# Patient Record
Sex: FEMALE | Race: WHITE | NOT HISPANIC OR LATINO | Employment: UNEMPLOYED | URBAN - METROPOLITAN AREA
[De-identification: names, ages, dates, MRNs, and addresses within clinical notes are randomized per-mention and may not be internally consistent; named-entity substitution may affect disease eponyms.]

---

## 2024-01-03 ENCOUNTER — OFFICE VISIT (OUTPATIENT)
Dept: OBSTETRICS AND GYNECOLOGY | Facility: CLINIC | Age: 39
End: 2024-01-03
Payer: MEDICAID

## 2024-01-03 VITALS
HEART RATE: 86 BPM | BODY MASS INDEX: 26.11 KG/M2 | HEIGHT: 60 IN | SYSTOLIC BLOOD PRESSURE: 110 MMHG | DIASTOLIC BLOOD PRESSURE: 74 MMHG | WEIGHT: 133 LBS

## 2024-01-03 DIAGNOSIS — N93.9 ABNORMAL UTERINE BLEEDING: Primary | ICD-10-CM

## 2024-01-03 PROCEDURE — 1160F RVW MEDS BY RX/DR IN RCRD: CPT | Performed by: OBSTETRICS & GYNECOLOGY

## 2024-01-03 PROCEDURE — 1159F MED LIST DOCD IN RCRD: CPT | Performed by: OBSTETRICS & GYNECOLOGY

## 2024-01-03 PROCEDURE — 99203 OFFICE O/P NEW LOW 30 MIN: CPT | Performed by: OBSTETRICS & GYNECOLOGY

## 2024-01-03 RX ORDER — SODIUM CHLORIDE 0.9 % (FLUSH) 0.9 %
10 SYRINGE (ML) INJECTION AS NEEDED
OUTPATIENT
Start: 2024-01-03

## 2024-01-03 RX ORDER — SODIUM CHLORIDE 9 MG/ML
40 INJECTION, SOLUTION INTRAVENOUS AS NEEDED
OUTPATIENT
Start: 2024-01-03

## 2024-01-03 RX ORDER — SODIUM CHLORIDE, SODIUM LACTATE, POTASSIUM CHLORIDE, CALCIUM CHLORIDE 600; 310; 30; 20 MG/100ML; MG/100ML; MG/100ML; MG/100ML
125 INJECTION, SOLUTION INTRAVENOUS CONTINUOUS
OUTPATIENT
Start: 2024-01-03

## 2024-01-03 RX ORDER — SODIUM CHLORIDE 0.9 % (FLUSH) 0.9 %
3 SYRINGE (ML) INJECTION EVERY 12 HOURS SCHEDULED
OUTPATIENT
Start: 2024-01-03

## 2024-01-03 RX ORDER — ONDANSETRON 2 MG/ML
4 INJECTION INTRAMUSCULAR; INTRAVENOUS EVERY 6 HOURS PRN
OUTPATIENT
Start: 2024-01-03

## 2024-01-03 NOTE — PROGRESS NOTES
"GYN Visit    CC: Abnormal menses    HPI:   38 y.o. who presents for evaluation of abnormal menses. Menses is monthly. Very heavy and painful for the first 2 days. Then tapers off after 4-5 days. Really cannot function for those first 2 days. This has been this way for years. Has tried OCPs in the past with no improvement. Has used the birth control patch with no improvement. NSAIDs don't help.  The patient is interested in proceeding with surgery.    History: PMHx, Meds, Allergies, PSHx, Social Hx, and POBHx all reviewed and updated.    /74   Pulse 86   Ht 152.4 cm (60\")   Wt 60.3 kg (133 lb)   LMP 2023 (Exact Date)   BMI 25.97 kg/m²     Physical Exam  Vitals and nursing note reviewed. Exam conducted with a chaperone present.   Constitutional:       General: She is not in acute distress.     Appearance: Normal appearance. She is not ill-appearing.   Abdominal:      General: Abdomen is flat. There is no distension.      Palpations: Abdomen is soft. There is no mass.      Tenderness: There is no abdominal tenderness. There is no guarding or rebound.      Hernia: No hernia is present.   Genitourinary:     General: Normal vulva.      Exam position: Lithotomy position.      Labia:         Right: No rash, tenderness, lesion or injury.         Left: No rash, tenderness, lesion or injury.       Vagina: No signs of injury. No vaginal discharge, erythema, tenderness, bleeding or prolapsed vaginal walls.      Cervix: No cervical motion tenderness, friability, lesion, erythema or cervical bleeding.      Uterus: Not deviated, not enlarged, not fixed and not tender.       Adnexa:         Right: No mass or tenderness.          Left: No mass or tenderness.     Musculoskeletal:         General: No swelling.      Right lower leg: No edema.      Left lower leg: No edema.   Skin:     General: Skin is warm and dry.      Findings: No rash.   Neurological:      Mental Status: She is alert and oriented to person, " place, and time.   Psychiatric:         Mood and Affect: Mood normal.         Behavior: Behavior normal.         Thought Content: Thought content normal.         Judgment: Judgment normal.     Pelvic ultrasound 11/21/2023 reviewed      ASSESSMENT AND PLAN:  Diagnoses and all orders for this visit:    1. Abnormal uterine bleeding (Primary)  Assessment & Plan:  Given the patient's significantly abnormal menses and failure medical therapy in the past she is interested in proceeding with surgical therapy.  Given her failure past medical therapy I think is reasonable consider surgery.  We did discuss the option of continued medical therapy or even considering something like an IUD.  The patient declines.  We discussed surgical options including D&C alone, endometrial ablation and hysterectomy.  The risk, benefits, alternatives of each were discussed.  The patient is most interested in proceeding with endometrial ablation.  The risks, benefits, and alternatives to the procedure have been reviewed the patient.  We have discussed that the goal of endometrial ablation is to resume a normal menses and the amenorrhea is not guaranteed.  We discussed that amenorrhea only occurs in 40 to 50% of all patients who undergo endometrial ablation.  The risks included, but not limited to, infection, bleeding, hemorrhage, blood transfusion. Injury to nearby structures including: Bowel, bladder, pelvic blood vessels and nerves, ureters, and other nearby structures.  We have discussed the risk of delayed thermal injury to these nearby structures.  We have discussed the risk of continued abnormal bleeding and the possible need for further surgery, including hysterectomy.  We have discussed that pregnancy is contraindicated once an ablation has occurred and that the patient should never seek out pregnancy after endometrial ablation.  We discussed the risks of anesthesia.  The risks of postoperative complications, such as: Venous  thromboembolism, myocardial infarction, stroke, and death.  The patient expressed her extending of the risks on the fracture, and alternatives to the procedure, and wishes to proceed.  The risks, benefits, and alternatives to the procedure have been reviewed the patient.  We have discussed that the goal of endometrial ablation is to resume a normal menses and the amenorrhea is not guaranteed.  We discussed that amenorrhea only occurs in 40 to 50% of all patients who undergo endometrial ablation.  The risks included, but not limited to, infection, bleeding, hemorrhage, blood transfusion. Injury to nearby structures including: Bowel, bladder, pelvic blood vessels and nerves, ureters, and other nearby structures.  We have discussed the risk of delayed thermal injury to these nearby structures.  We have discussed the risk of continued abnormal bleeding and the possible need for further surgery, including hysterectomy.  We have discussed that pregnancy is contraindicated once an ablation has occurred and that the patient should never seek out pregnancy after endometrial ablation.  We discussed the risks of anesthesia.  The risks of postoperative complications, such as: Venous thromboembolism, myocardial infarction, stroke, and death.  The patient expressed her extending of the risks on the fracture, and alternatives to the procedure, and wishes to proceed.      Orders:  -     CBC (No Diff)  -     TSH  -     Case Request; Standing  -     sodium chloride 0.9 % flush 3 mL  -     sodium chloride 0.9 % flush 10 mL  -     sodium chloride 0.9 % infusion 40 mL  -     lactated ringers infusion  -     ondansetron (ZOFRAN) injection 4 mg  -     ceFAZolin (ANCEF) 2,000 mg in sodium chloride 0.9 % 100 mL IVPB  -     Case Request    Other orders  -     Follow Anesthesia Guidelines / Protocol; Future  -     Follow Anesthesia Guidelines / Protocol; Standing  -     Verify / Perform Chlorhexidine Skin Prep; Standing  -     Verify /  Perform Chlorhexidine Skin Prep if Indicated (If Not Already Completed); Standing  -     Obtain Informed Consent; Future  -     Provide NPO Instructions to Patient; Future  -     Chlorhexidine Skin Prep; Future  -     Notify Physician - Standard; Standing  -     Type & Screen; Standing  -     Insert Peripheral IV; Standing  -     Saline Lock & Maintain IV Access; Standing  -     CBC & Differential; Standing  -     hCG, Quantitative, Pregnancy; Standing        Counseling: TRACK MENSES, RTO if <q21 days (frequent) or >q3mo (infrequent IF not on hormonal BC), >7d long, heavy, or painful.    All BIRTH CONTROL options R/B/A/SE/E of each reviewed in detail.  MERRILL risk w hormonal BIRTH CONTROL reviewed (estrogen containing only), S/Sx to watch for discussed and questions answered.  Newer studies indicate possible increased breast cancer reviewed (both estrogen and progestin only).    Follow Up:  Return in about 2 weeks (around 1/17/2024) for EMB.      Zay Messer MD  01/03/2024

## 2024-01-04 NOTE — ASSESSMENT & PLAN NOTE
Given the patient's significantly abnormal menses and failure medical therapy in the past she is interested in proceeding with surgical therapy.  Given her failure past medical therapy I think is reasonable consider surgery.  We did discuss the option of continued medical therapy or even considering something like an IUD.  The patient declines.  We discussed surgical options including D&C alone, endometrial ablation and hysterectomy.  The risk, benefits, alternatives of each were discussed.  The patient is most interested in proceeding with endometrial ablation.  The risks, benefits, and alternatives to the procedure have been reviewed the patient.  We have discussed that the goal of endometrial ablation is to resume a normal menses and the amenorrhea is not guaranteed.  We discussed that amenorrhea only occurs in 40 to 50% of all patients who undergo endometrial ablation.  The risks included, but not limited to, infection, bleeding, hemorrhage, blood transfusion. Injury to nearby structures including: Bowel, bladder, pelvic blood vessels and nerves, ureters, and other nearby structures.  We have discussed the risk of delayed thermal injury to these nearby structures.  We have discussed the risk of continued abnormal bleeding and the possible need for further surgery, including hysterectomy.  We have discussed that pregnancy is contraindicated once an ablation has occurred and that the patient should never seek out pregnancy after endometrial ablation.  We discussed the risks of anesthesia.  The risks of postoperative complications, such as: Venous thromboembolism, myocardial infarction, stroke, and death.  The patient expressed her extending of the risks on the fracture, and alternatives to the procedure, and wishes to proceed.  The risks, benefits, and alternatives to the procedure have been reviewed the patient.  We have discussed that the goal of endometrial ablation is to resume a normal menses and the  amenorrhea is not guaranteed.  We discussed that amenorrhea only occurs in 40 to 50% of all patients who undergo endometrial ablation.  The risks included, but not limited to, infection, bleeding, hemorrhage, blood transfusion. Injury to nearby structures including: Bowel, bladder, pelvic blood vessels and nerves, ureters, and other nearby structures.  We have discussed the risk of delayed thermal injury to these nearby structures.  We have discussed the risk of continued abnormal bleeding and the possible need for further surgery, including hysterectomy.  We have discussed that pregnancy is contraindicated once an ablation has occurred and that the patient should never seek out pregnancy after endometrial ablation.  We discussed the risks of anesthesia.  The risks of postoperative complications, such as: Venous thromboembolism, myocardial infarction, stroke, and death.  The patient expressed her extending of the risks on the fracture, and alternatives to the procedure, and wishes to proceed.

## 2024-01-18 ENCOUNTER — TELEPHONE (OUTPATIENT)
Dept: OBSTETRICS AND GYNECOLOGY | Facility: CLINIC | Age: 39
End: 2024-01-18

## 2024-02-06 ENCOUNTER — PROCEDURE VISIT (OUTPATIENT)
Dept: OBSTETRICS AND GYNECOLOGY | Facility: CLINIC | Age: 39
End: 2024-02-06
Payer: MEDICAID

## 2024-02-06 VITALS
SYSTOLIC BLOOD PRESSURE: 106 MMHG | DIASTOLIC BLOOD PRESSURE: 79 MMHG | WEIGHT: 134 LBS | BODY MASS INDEX: 26.31 KG/M2 | HEIGHT: 60 IN

## 2024-02-06 DIAGNOSIS — N93.9 ABNORMAL UTERINE BLEEDING: Primary | ICD-10-CM

## 2024-02-06 DIAGNOSIS — Z01.812 PRE-PROCEDURAL LABORATORY EXAMINATION: ICD-10-CM

## 2024-02-06 LAB
B-HCG UR QL: NEGATIVE
DEPRECATED RDW RBC AUTO: 41.8 FL (ref 37–54)
ERYTHROCYTE [DISTWIDTH] IN BLOOD BY AUTOMATED COUNT: 12 % (ref 12.3–15.4)
EXPIRATION DATE: NORMAL
HCT VFR BLD AUTO: 43.1 % (ref 34–46.6)
HGB BLD-MCNC: 14.4 G/DL (ref 12–15.9)
INTERNAL NEGATIVE CONTROL: NORMAL
INTERNAL POSITIVE CONTROL: NORMAL
Lab: NORMAL
MCH RBC QN AUTO: 31.6 PG (ref 26.6–33)
MCHC RBC AUTO-ENTMCNC: 33.4 G/DL (ref 31.5–35.7)
MCV RBC AUTO: 94.5 FL (ref 79–97)
PLATELET # BLD AUTO: 266 10*3/MM3 (ref 140–450)
PMV BLD AUTO: 11.1 FL (ref 6–12)
RBC # BLD AUTO: 4.56 10*6/MM3 (ref 3.77–5.28)
T4 FREE SERPL-MCNC: 1.26 NG/DL (ref 0.93–1.7)
TSH SERPL DL<=0.05 MIU/L-ACNC: 0.83 UIU/ML (ref 0.27–4.2)
WBC NRBC COR # BLD AUTO: 10.05 10*3/MM3 (ref 3.4–10.8)

## 2024-02-06 PROCEDURE — 88305 TISSUE EXAM BY PATHOLOGIST: CPT | Performed by: OBSTETRICS & GYNECOLOGY

## 2024-02-06 PROCEDURE — 84443 ASSAY THYROID STIM HORMONE: CPT | Performed by: OBSTETRICS & GYNECOLOGY

## 2024-02-06 PROCEDURE — 85027 COMPLETE CBC AUTOMATED: CPT | Performed by: OBSTETRICS & GYNECOLOGY

## 2024-02-06 PROCEDURE — 58100 BIOPSY OF UTERUS LINING: CPT | Performed by: OBSTETRICS & GYNECOLOGY

## 2024-02-06 PROCEDURE — 81025 URINE PREGNANCY TEST: CPT | Performed by: OBSTETRICS & GYNECOLOGY

## 2024-02-06 PROCEDURE — 84439 ASSAY OF FREE THYROXINE: CPT | Performed by: OBSTETRICS & GYNECOLOGY

## 2024-02-06 NOTE — PROGRESS NOTES
Endometrial Biopsy    Karey Hammond is a 38 y.o. female,   , whose last menstrual period was Patient's last menstrual period was 2023..  The patient has a history of abnormal uterine bleeding and presents for an endometrial biopsy.  Patient denies vaginal bleeding. .  After the indications, risks, benefits, and alternatives to performing and endometrial biopsy were explained to the patient, and she desires to proceed.  A urine pregnancy test was negative.     PROCEDURE:  The patient was placed on the table in the supine lithotomy position.  She was draped in the appropriate manner.  A speculum was placed in the vagina.  The cervix was visualized and prepped with Betadine. A tenaculum was placed. A small plastic 5 mm Pipelle syringe curette was inserted into the cervical canal.  The uterus was sounded to 8 cms.  A vigorous four quadrant biopsy was performed, removing a moderate amount of tissue.  This tissue was placed in Formalin and sent to pathology.  The patient tolerated the procedure well and reported Moderate cramping.  She had Mild cramping at the time of discharge.  Physical Exam    Procedures    Review of Systems      Plan:  Orders Placed This Encounter   Procedures    CBC (No Diff)     Order Specific Question:   Release to patient     Answer:   Routine Release [[id number]]    TSH     Order Specific Question:   Release to patient     Answer:   Routine Release [[id number]]    T4, free     Order Specific Question:   Release to patient     Answer:   Routine Release [[id number]]    POC Pregnancy, Urine     Order Specific Question:   Release to patient     Answer:   Routine Release [[id number]]       Problem List Items Addressed This Visit       Abnormal uterine bleeding - Primary    Relevant Orders    Tissue Pathology Exam    CBC (No Diff)    TSH    T4, free     Other Visit Diagnoses       Pre-procedural laboratory examination        Relevant Orders    POC Pregnancy, Urine (Completed)                 Instructions  Call the office in 5 business days for biopsy results.  Patient instructed to call the office if develops a fever of 100.4 or greater, vaginal bleeding heavier than a period, foul vaginal discharge or pain.      Zay Messer MD

## 2024-02-06 NOTE — PATIENT INSTRUCTIONS
Venipuncture Blood Specimen Collection  Venipuncture performed in left arm by Mary Ann Grier with good hemostasis. Patient tolerated the procedure well without complications.   02/06/24   Mary Ann Grier

## 2024-02-08 LAB
CYTO UR: NORMAL
LAB AP CASE REPORT: NORMAL
LAB AP CLINICAL INFORMATION: NORMAL
PATH REPORT.FINAL DX SPEC: NORMAL
PATH REPORT.GROSS SPEC: NORMAL

## 2024-03-13 PROCEDURE — S0260 H&P FOR SURGERY: HCPCS | Performed by: OBSTETRICS & GYNECOLOGY

## 2024-03-13 NOTE — H&P
[facility]   HISTORY AND PHYSICAL    Patient Name: Karey Hammond  : 1985  MRN: [id number]  Primary Care Physician:  Leonor Figueroa APRN  Date of admission: 3/14/2024    Subjective   Subjective     Chief Complaint: Here for surgery    HPI:  Karey Hammond is a 38 y.o. female who presents for surgical management of abnormal uterine bleeding.  The patient has very heavy menstrual flow lasting for 7 days.  She has tried numerous medical therapies including birth control pills in the past with no improvement.  She also has significant pelvic cramping and pain the first 2 days of her menstrual cycle.  She wishes to proceed with surgical therapy at this time.  After reviewing all of her surgical options the patient has settled on proceeding with endometrial ablation    Review of Systems   All systems were reviewed and negative except for: Heavy menses, painful menses    Personal History     Past Medical History:   Diagnosis Date    Abnormal uterine bleeding (AUB)     Anxiety        Past Surgical History:   Procedure Laterality Date    DILATATION AND CURETTAGE      TUBAL ABDOMINAL LIGATION         Family History: family history includes Breast cancer in her maternal great-grandmother. Otherwise pertinent FHx was reviewed and not pertinent to current issue.    Social History:  reports that she has been smoking cigarettes. She does not have any smokeless tobacco history on file. She reports current alcohol use. She reports current drug use. Drug: Marijuana.    Home Medications:         Allergies:  No Known Allergies    Objective   Objective     Vitals:   Temp:  [97.5 °F (36.4 °C)] 97.5 °F (36.4 °C)  Heart Rate:  [86] 86  Resp:  [18] 18  BP: (116)/(71) 116/71  Physical Exam    Constitutional: Awake, alert   Eyes: PERRLA, sclerae anicteric, no conjunctival injection   HENT: NCAT, mucous membranes moist   Neck: Supple, no thyromegaly, no lymphadenopathy, trachea midline   Respiratory: Clear to auscultation  bilaterally, nonlabored respirations    Cardiovascular: RRR, no murmurs, rubs, or gallops, palpable pedal pulses bilaterally   Gastrointestinal: Positive bowel sounds, soft, nontender, nondistended              Genitourinary: Normal external genitalia, vagina, and normal-appearing cervix.  The uterus is approximately 9 to 10 cm in size mobile and nontender.  There are no palpable uterine or adnexal masses.   Musculoskeletal: No bilateral ankle edema, no clubbing or cyanosis to extremities   Psychiatric: Appropriate affect, cooperative   Neurologic: Oriented x 3, strength symmetric in all extremities, speech clear   Skin: No rashes     Result Review    Result Review:  I have personally reviewed the results from the time of this admission to 3/14/2024 10:54 EDT and agree with these findings:  [x]  Laboratory  []  Microbiology  [x]  Radiology  []  EKG/Telemetry   []  Cardiology/Vascular   [x]  Pathology  [x]  Old records  []  Other:      Assessment & Plan   Assessment / Plan   Active Hospital Problems:  Active Hospital Problems    Diagnosis     **Abnormal uterine bleeding      Plan:   The patient has significant abnormal uterine bleeding resulting in impaired quality of life, and refractory to medical therapies.  She wishes to proceed with surgical therapy with an endometrial ablation to treat her symptoms.  She also has significant dysmenorrhea.  We have discussed that endometrial ablation does not specifically treat painful menstrual cycles, unless that pain is related to the amount of flow that the patient is having.  The patient expresses her understanding and desires to proceed with endometrial ablation.  She has declined hysterectomy.  The risks, benefits, and alternatives to the procedure have been reviewed the patient.  We have discussed that the goal of endometrial ablation is to resume a normal menses and the amenorrhea is not guaranteed.  We discussed that amenorrhea only occurs in 40 to 50% of all patients  who undergo endometrial ablation.  The risks included, but not limited to, infection, bleeding, hemorrhage, blood transfusion. Injury to nearby structures including: Bowel, bladder, pelvic blood vessels and nerves, ureters, and other nearby structures.  We have discussed the risk of delayed thermal injury to these nearby structures.  We have discussed the risk of continued abnormal bleeding and the possible need for further surgery, including hysterectomy.  We have discussed that pregnancy is contraindicated once an ablation has occurred and that the patient should never seek out pregnancy after endometrial ablation.  We discussed the risks of anesthesia.  The risks of postoperative complications, such as: Venous thromboembolism, myocardial infarction, stroke, and death.  The patient expressed her extending of the risks on the fracture, and alternatives to the procedure, and wishes to proceed.        Electronically signed by Zay Messer MD, 03/13/24, 10:18 AM EDT.

## 2024-03-13 NOTE — PRE-PROCEDURE INSTRUCTIONS
PATIENT INSTRUCTED TO BE:    - NOTHING TO EAT AFTER MIDNIGHT OR CHEW, EXCEPT CAN HAVE CLEAR LIQUIDS 2 HOURS PRIOR TO SURGERY ARRIVAL TIME     - TO HOLD ALL VITAMINS, SUPPLEMENTS, NSAIDS FOR ONE WEEK PRIOR TO THEIR SURGICAL PROCEDURE    - DO NOT TAKE _---------------_ 7 DAYS PRIOR TO PROCEDURE PER ANESTHESIA RECOMMENDATIONS/INSTRUCTIONS     - INSTRUCTED PT TO USE SURGICAL SOAP 1 TIME THE NIGHT PRIOR TO SURGERY OR THE AM OF SURGERY.   USE SOAP FROM NECK TO TOES AVOID THEIR FACE, HAIR, AND PRIVATE PARTS. INSTRUCTED NO LOTIONS, JEWELRY, PIERCINGS, OR DEODORANT DAY OF SURGERY    - IF DIABETIC, CHECK BLOOD GLUCOSE IF LESS THAN 70 OR HAVING SYMPTOMS CALL THE PREOP AREA FOR INSTRUCTIONS ON AM OF SURGERY ([phone] )    -INSTRUCTED TO TAKE THE FOLLOWING MEDICATIONS THE DAY OF SURGERY WITH SIPS OF WATER:             NONE         - DO NOT BRING ANY MEDICATIONS WITH YOU TO THE HOSPITAL THE DAY OF SURGERY, EXCEPT IF USE INHALERS. BRING INHALERS DAY OF SURGERY       - BRING CPAP OR BIPAP TO THE HOSPITAL ONLY IF ARE SPENDING THE NIGHT    - DO NOT SMOKE OR VAPE 24 HOURS PRIOR TO PROCEDURE PER ANESTHESIA REQUEST     -MAKE SURE YOU HAVE A RIDE HOME OR SOMEONE TO STAY WITH YOU THE DAY OF THE PROCEDURE AFTER YOU GO HOME    - FOLLOW ANY OTHER INSTRUCTIONS GIVEN TO YOU BY YOUR SURGEON'S OFFICE.     - PREADMISSION TESTING NURSE CORWIN JONES RN AT  [phone] IF HAVE ANY QUESTIONS     PATIENT PROVIDED THE NUMBER FOR PREOP SURGICAL DEPT IF HAD QUESTIONS AFTER HOURS PRIOR TO SURGERY ([phone] )  INFORMED PT IF NO ANSWER, LEAVE A MESSAGE AND SOMEONE WILL RETURN THEIR CALL       PATIENT VERBALIZED UNDERSTANDING

## 2024-03-14 ENCOUNTER — ANESTHESIA EVENT (OUTPATIENT)
Dept: PERIOP | Facility: HOSPITAL | Age: 39
End: 2024-03-14
Payer: MEDICAID

## 2024-03-14 ENCOUNTER — ANESTHESIA (OUTPATIENT)
Dept: PERIOP | Facility: HOSPITAL | Age: 39
End: 2024-03-14
Payer: MEDICAID

## 2024-03-14 ENCOUNTER — HOSPITAL ENCOUNTER (OUTPATIENT)
Facility: HOSPITAL | Age: 39
Setting detail: HOSPITAL OUTPATIENT SURGERY
Discharge: HOME | End: 2024-03-14
Attending: OBSTETRICS & GYNECOLOGY | Admitting: OBSTETRICS & GYNECOLOGY
Payer: MEDICAID

## 2024-03-14 VITALS
WEIGHT: 133.16 LBS | BODY MASS INDEX: 25.14 KG/M2 | DIASTOLIC BLOOD PRESSURE: 60 MMHG | TEMPERATURE: 97.8 F | HEIGHT: 61 IN | HEART RATE: 54 BPM | OXYGEN SATURATION: 100 % | SYSTOLIC BLOOD PRESSURE: 122 MMHG | RESPIRATION RATE: 20 BRPM

## 2024-03-14 DIAGNOSIS — Z98.890 S/P ENDOMETRIAL ABLATION: Primary | ICD-10-CM

## 2024-03-14 DIAGNOSIS — N93.9 ABNORMAL UTERINE BLEEDING: ICD-10-CM

## 2024-03-14 LAB
ABO GROUP BLD: NORMAL
ABO GROUP BLD: NORMAL
BASOPHILS # BLD AUTO: 0.04 10*3/MM3 (ref 0–0.2)
BASOPHILS NFR BLD AUTO: 0.5 % (ref 0–1.5)
BLD GP AB SCN SERPL QL: NEGATIVE
DEPRECATED RDW RBC AUTO: 45.9 FL (ref 37–54)
EOSINOPHIL # BLD AUTO: 0.1 10*3/MM3 (ref 0–0.4)
EOSINOPHIL NFR BLD AUTO: 1.1 % (ref 0.3–6.2)
ERYTHROCYTE [DISTWIDTH] IN BLOOD BY AUTOMATED COUNT: 12.9 % (ref 12.3–15.4)
HCG INTACT+B SERPL-ACNC: <0.5 MIU/ML
HCT VFR BLD AUTO: 42.2 % (ref 34–46.6)
HGB BLD-MCNC: 13.5 G/DL (ref 12–15.9)
IMM GRANULOCYTES # BLD AUTO: 0.03 10*3/MM3 (ref 0–0.05)
IMM GRANULOCYTES NFR BLD AUTO: 0.3 % (ref 0–0.5)
LYMPHOCYTES # BLD AUTO: 2.4 10*3/MM3 (ref 0.7–3.1)
LYMPHOCYTES NFR BLD AUTO: 27.2 % (ref 19.6–45.3)
MCH RBC QN AUTO: 30.5 PG (ref 26.6–33)
MCHC RBC AUTO-ENTMCNC: 32 G/DL (ref 31.5–35.7)
MCV RBC AUTO: 95.5 FL (ref 79–97)
MONOCYTES # BLD AUTO: 0.54 10*3/MM3 (ref 0.1–0.9)
MONOCYTES NFR BLD AUTO: 6.1 % (ref 5–12)
NEUTROPHILS NFR BLD AUTO: 5.71 10*3/MM3 (ref 1.7–7)
NEUTROPHILS NFR BLD AUTO: 64.8 % (ref 42.7–76)
NRBC BLD AUTO-RTO: 0 /100 WBC (ref 0–0.2)
PLATELET # BLD AUTO: 285 10*3/MM3 (ref 140–450)
PMV BLD AUTO: 10.1 FL (ref 6–12)
RBC # BLD AUTO: 4.42 10*6/MM3 (ref 3.77–5.28)
RH BLD: NEGATIVE
RH BLD: NEGATIVE
T&S EXPIRATION DATE: NORMAL
WBC NRBC COR # BLD AUTO: 8.82 10*3/MM3 (ref 3.4–10.8)

## 2024-03-14 PROCEDURE — 84702 CHORIONIC GONADOTROPIN TEST: CPT | Performed by: OBSTETRICS & GYNECOLOGY

## 2024-03-14 PROCEDURE — 86850 RBC ANTIBODY SCREEN: CPT | Performed by: OBSTETRICS & GYNECOLOGY

## 2024-03-14 PROCEDURE — 58563 HYSTEROSCOPY ABLATION: CPT | Performed by: OBSTETRICS & GYNECOLOGY

## 2024-03-14 PROCEDURE — 85025 COMPLETE CBC W/AUTO DIFF WBC: CPT | Performed by: OBSTETRICS & GYNECOLOGY

## 2024-03-14 PROCEDURE — 88305 TISSUE EXAM BY PATHOLOGIST: CPT | Performed by: OBSTETRICS & GYNECOLOGY

## 2024-03-14 PROCEDURE — 25010000002 HYDROMORPHONE 1 MG/ML SOLUTION: Performed by: NURSE ANESTHETIST, CERTIFIED REGISTERED

## 2024-03-14 PROCEDURE — 0 CEFAZOLIN SODIUM 2 G RECONSTITUTED SOLUTION: Performed by: OBSTETRICS & GYNECOLOGY

## 2024-03-14 PROCEDURE — 25010000002 BUPIVACAINE (PF) 0.5 % SOLUTION: Performed by: OBSTETRICS & GYNECOLOGY

## 2024-03-14 PROCEDURE — 25010000002 ONDANSETRON PER 1 MG: Performed by: NURSE ANESTHETIST, CERTIFIED REGISTERED

## 2024-03-14 PROCEDURE — 86900 BLOOD TYPING SEROLOGIC ABO: CPT | Performed by: OBSTETRICS & GYNECOLOGY

## 2024-03-14 PROCEDURE — 25810000003 LACTATED RINGERS PER 1000 ML: Performed by: ANESTHESIOLOGY

## 2024-03-14 PROCEDURE — 86900 BLOOD TYPING SEROLOGIC ABO: CPT

## 2024-03-14 PROCEDURE — 86901 BLOOD TYPING SEROLOGIC RH(D): CPT

## 2024-03-14 PROCEDURE — 25010000002 MIDAZOLAM PER 1MG: Performed by: ANESTHESIOLOGY

## 2024-03-14 PROCEDURE — 86901 BLOOD TYPING SEROLOGIC RH(D): CPT | Performed by: OBSTETRICS & GYNECOLOGY

## 2024-03-14 PROCEDURE — 25010000002 PROPOFOL 10 MG/ML EMULSION

## 2024-03-14 RX ORDER — IBUPROFEN 600 MG/1
600 TABLET ORAL EVERY 6 HOURS PRN
Qty: 60 TABLET | Refills: 1 | Status: SHIPPED | OUTPATIENT
Start: 2024-03-14

## 2024-03-14 RX ORDER — PHENYLEPHRINE HCL IN 0.9% NACL 1 MG/10 ML
SYRINGE (ML) INTRAVENOUS AS NEEDED
Status: DISCONTINUED | OUTPATIENT
Start: 2024-03-14 | End: 2024-03-14 | Stop reason: SURG

## 2024-03-14 RX ORDER — ONDANSETRON 2 MG/ML
4 INJECTION INTRAMUSCULAR; INTRAVENOUS EVERY 6 HOURS PRN
Status: DISCONTINUED | OUTPATIENT
Start: 2024-03-14 | End: 2024-03-14 | Stop reason: HOSPADM

## 2024-03-14 RX ORDER — TRAMADOL HYDROCHLORIDE 50 MG/1
50 TABLET ORAL EVERY 6 HOURS PRN
Qty: 12 TABLET | Refills: 0 | Status: SHIPPED | OUTPATIENT
Start: 2024-03-14

## 2024-03-14 RX ORDER — MIDAZOLAM HYDROCHLORIDE 2 MG/2ML
2 INJECTION, SOLUTION INTRAMUSCULAR; INTRAVENOUS ONCE
Status: COMPLETED | OUTPATIENT
Start: 2024-03-14 | End: 2024-03-14

## 2024-03-14 RX ORDER — SODIUM CHLORIDE 9 MG/ML
40 INJECTION, SOLUTION INTRAVENOUS AS NEEDED
Status: DISCONTINUED | OUTPATIENT
Start: 2024-03-14 | End: 2024-03-14 | Stop reason: HOSPADM

## 2024-03-14 RX ORDER — OXYCODONE HYDROCHLORIDE 5 MG/1
5 TABLET ORAL
Status: COMPLETED | OUTPATIENT
Start: 2024-03-14 | End: 2024-03-14

## 2024-03-14 RX ORDER — MAGNESIUM HYDROXIDE 1200 MG/15ML
LIQUID ORAL AS NEEDED
Status: DISCONTINUED | OUTPATIENT
Start: 2024-03-14 | End: 2024-03-14 | Stop reason: HOSPADM

## 2024-03-14 RX ORDER — SODIUM CHLORIDE 0.9 % (FLUSH) 0.9 %
3 SYRINGE (ML) INJECTION EVERY 12 HOURS SCHEDULED
Status: DISCONTINUED | OUTPATIENT
Start: 2024-03-14 | End: 2024-03-14 | Stop reason: HOSPADM

## 2024-03-14 RX ORDER — ONDANSETRON 2 MG/ML
4 INJECTION INTRAMUSCULAR; INTRAVENOUS ONCE AS NEEDED
Status: DISCONTINUED | OUTPATIENT
Start: 2024-03-14 | End: 2024-03-14 | Stop reason: HOSPADM

## 2024-03-14 RX ORDER — DEXMEDETOMIDINE HYDROCHLORIDE 100 UG/ML
INJECTION, SOLUTION INTRAVENOUS AS NEEDED
Status: DISCONTINUED | OUTPATIENT
Start: 2024-03-14 | End: 2024-03-14 | Stop reason: SURG

## 2024-03-14 RX ORDER — SODIUM CHLORIDE, SODIUM LACTATE, POTASSIUM CHLORIDE, CALCIUM CHLORIDE 600; 310; 30; 20 MG/100ML; MG/100ML; MG/100ML; MG/100ML
9 INJECTION, SOLUTION INTRAVENOUS CONTINUOUS PRN
Status: DISCONTINUED | OUTPATIENT
Start: 2024-03-14 | End: 2024-03-14 | Stop reason: HOSPADM

## 2024-03-14 RX ORDER — PROMETHAZINE HYDROCHLORIDE 25 MG/1
25 SUPPOSITORY RECTAL ONCE AS NEEDED
Status: DISCONTINUED | OUTPATIENT
Start: 2024-03-14 | End: 2024-03-14 | Stop reason: HOSPADM

## 2024-03-14 RX ORDER — MEPERIDINE HYDROCHLORIDE 25 MG/ML
12.5 INJECTION INTRAMUSCULAR; INTRAVENOUS; SUBCUTANEOUS
Status: DISCONTINUED | OUTPATIENT
Start: 2024-03-14 | End: 2024-03-14 | Stop reason: HOSPADM

## 2024-03-14 RX ORDER — SODIUM CHLORIDE 0.9 % (FLUSH) 0.9 %
10 SYRINGE (ML) INJECTION AS NEEDED
Status: DISCONTINUED | OUTPATIENT
Start: 2024-03-14 | End: 2024-03-14 | Stop reason: HOSPADM

## 2024-03-14 RX ORDER — PROPOFOL 10 MG/ML
VIAL (ML) INTRAVENOUS AS NEEDED
Status: DISCONTINUED | OUTPATIENT
Start: 2024-03-14 | End: 2024-03-14 | Stop reason: SURG

## 2024-03-14 RX ORDER — BUPIVACAINE HCL/0.9 % NACL/PF 0.1 %
2000 PLASTIC BAG, INJECTION (ML) EPIDURAL ONCE
Status: COMPLETED | OUTPATIENT
Start: 2024-03-14 | End: 2024-03-14

## 2024-03-14 RX ORDER — IBUPROFEN 600 MG/1
600 TABLET ORAL EVERY 6 HOURS PRN
Status: DISCONTINUED | OUTPATIENT
Start: 2024-03-14 | End: 2024-03-14 | Stop reason: HOSPADM

## 2024-03-14 RX ORDER — SCOLOPAMINE TRANSDERMAL SYSTEM 1 MG/1
1 PATCH, EXTENDED RELEASE TRANSDERMAL ONCE
Status: DISCONTINUED | OUTPATIENT
Start: 2024-03-14 | End: 2024-03-14 | Stop reason: HOSPADM

## 2024-03-14 RX ORDER — PROMETHAZINE HYDROCHLORIDE 12.5 MG/1
25 TABLET ORAL ONCE AS NEEDED
Status: DISCONTINUED | OUTPATIENT
Start: 2024-03-14 | End: 2024-03-14 | Stop reason: HOSPADM

## 2024-03-14 RX ORDER — ACETAMINOPHEN 500 MG
1000 TABLET ORAL ONCE
Status: COMPLETED | OUTPATIENT
Start: 2024-03-14 | End: 2024-03-14

## 2024-03-14 RX ORDER — SODIUM CHLORIDE, SODIUM LACTATE, POTASSIUM CHLORIDE, CALCIUM CHLORIDE 600; 310; 30; 20 MG/100ML; MG/100ML; MG/100ML; MG/100ML
125 INJECTION, SOLUTION INTRAVENOUS CONTINUOUS
Status: DISCONTINUED | OUTPATIENT
Start: 2024-03-14 | End: 2024-03-14 | Stop reason: HOSPADM

## 2024-03-14 RX ORDER — BUPIVACAINE HYDROCHLORIDE 5 MG/ML
INJECTION, SOLUTION EPIDURAL; INTRACAUDAL AS NEEDED
Status: DISCONTINUED | OUTPATIENT
Start: 2024-03-14 | End: 2024-03-14 | Stop reason: HOSPADM

## 2024-03-14 RX ADMIN — HYDROMORPHONE HYDROCHLORIDE 0.25 MG: 1 INJECTION, SOLUTION INTRAMUSCULAR; INTRAVENOUS; SUBCUTANEOUS at 12:53

## 2024-03-14 RX ADMIN — OXYCODONE 5 MG: 5 TABLET ORAL at 12:48

## 2024-03-14 RX ADMIN — OXYCODONE 5 MG: 5 TABLET ORAL at 13:27

## 2024-03-14 RX ADMIN — SODIUM CHLORIDE, POTASSIUM CHLORIDE, SODIUM LACTATE AND CALCIUM CHLORIDE 9 ML/HR: 600; 310; 30; 20 INJECTION, SOLUTION INTRAVENOUS at 09:06

## 2024-03-14 RX ADMIN — PROPOFOL 200 MCG/KG/MIN: 10 INJECTION, EMULSION INTRAVENOUS at 11:54

## 2024-03-14 RX ADMIN — MIDAZOLAM HYDROCHLORIDE 2 MG: 1 INJECTION, SOLUTION INTRAMUSCULAR; INTRAVENOUS at 11:40

## 2024-03-14 RX ADMIN — SCOPALAMINE 1 PATCH: 1 PATCH, EXTENDED RELEASE TRANSDERMAL at 09:13

## 2024-03-14 RX ADMIN — ACETAMINOPHEN 1000 MG: 500 TABLET ORAL at 09:12

## 2024-03-14 RX ADMIN — DEXMEDETOMIDINE 20 MCG: 100 INJECTION, SOLUTION INTRAVENOUS at 12:02

## 2024-03-14 RX ADMIN — ONDANSETRON 4 MG: 2 INJECTION INTRAMUSCULAR; INTRAVENOUS at 14:03

## 2024-03-14 RX ADMIN — Medication 2000 MG: at 12:00

## 2024-03-14 RX ADMIN — Medication 200 MCG: at 11:55

## 2024-03-14 RX ADMIN — PROPOFOL 50 MG: 10 INJECTION, EMULSION INTRAVENOUS at 11:54

## 2024-03-14 NOTE — OP NOTE
DILATATION AND CURETTAGE HYSTEROSCOPY NOVASURE ENDOMETRIAL ABLATION  Procedure Report    Patient Name:  Karey Hammond  YOB: 1985    Date of Surgery:  3/14/2024    Pre-op Diagnosis:   Abnormal uterine bleeding [N93.9]       Post-Op Diagnosis Codes:     * Abnormal uterine bleeding [N93.9]    Procedure(s):  DILATATION AND CURETTAGE HYSTEROSCOPY ENDOMETRIAL ABLATION    Staff:  Surgeon(s):  Zay Messer MD    Anesthesia: Monitored Anesthesia Care    Estimated Blood Loss:  20 ML      Specimen:          Specimens       ID Source Type Tests Collected By Collected At Frozen?    A Endometrial Curettings Tissue TISSUE PATHOLOGY EXAM   Zay Messer MD 3/14/24 1211 No                Findings: Normal external genitalia, vagina, and normal-appearing cervix.  The uterine cavity was normal without evidence of submucous myoma, congenital anomaly, or endometrial polyp.  Post ablation hysteroscopy revealed a good global ablation without unintended injury such as uterine perforation.    Complications: None    Description of Procedure: After reviewing the informed consent, including the risks, benefits and alternatives to the procedure, the patient expressed her understanding and wished to proceed.  She was taken to the operating room with an I.V. in place and running.  She was placed on the operating table in the dorsal supine position. She was given a MAC anesthetic.  She was re-positioned with her arms out to the side, and her legs in Yellofin stirrups.  We took care in positioning both the arms and the legs, padding any potential pressure points.  The patient was prepped and draped in the usual sterile fashion.  An in and out catheterization was performed to drain the patient's bladder.  A surgical time-out was performed.  I inserted a Gee retractor into the posterior vagina, and a Vita retractor into the anterior vagina. I grasped the cervix with the single tooth tenaculum.  I carried out my  paracervical block with 10 ml of 0.5% Marcaine with Epinephrine. I introduced a 5 mm 30 degree diagnostic hysteroscope, with normal saline as my distension media, through the external cervical os and into the uterine cavity without difficulty.      I surveyed the uterine cavity.  The uterine cavity appeared normal, without evidence of endometrial polyp, submucous myoma, or congenital anomaly.  The hysteroscope was removed.  The uterus was sounded to 8.5 cm.  Using a Hegar dilator, the cervix were estimated to be 4.5 cm.  The cavity length thus, was 4 cm.  The cervix was dilated to 6 mm.  I performed a systematic sharp curettage of the entire endometrium. This specimen was passed off as endometrial curettings.  I re-introduced the hysteroscope. There was a good curettage of the entire endometrium, and no evidence of any uterine injury. The hysteroscope was removed.  The NovaSure ablation device was called for.  The device array was deployed, and I had a uterine with greater than 2.5 cm.  The array was retracted back into the sleeve.  The NovaSure device was inserted through the external os, and gently to the fundus of the uterus, using a bow and arrow technique.  The array was then again deployed, and the device seated, per the 's recommendations.  The uterine width was 3.1 cm.  The power was determined by the device to be 68 Caceres.  The cervical collar was advanced against the cervix.  The cavity assessment was carried out, and passed.  The device was enabled, and the ablation initiated.  The ablation cycle carried out for 1 minute 27 seconds.  At the end of the ablation cycle, the array was retracted back into the NovaSure device a sleeve, using the bow and arrow technique.  The hysteroscope was reintroduced, and the cavity surveyed.  There was a good global ablation, without unintended injury or uterine perforation.  The hysteroscope was removed.  I removed the tenaculum.  The tenaculum sites were  hemostatic. The other instruments were removed.  Normal saline was used as a uterine distention media, and there was no significant fluid deficit.    The patient was taken out of lithotomy position, awoken from her anesthesia and then taken to the recovery room in satisfactory condition.  All counts were correct x 2.  The patient received Kefzol as her preoperative antibiotics. The patient will be discharged home when she meets discharge criteria. She will follow-up with me in 2-3 weeks.  She was instructed to call the office for temperature than greater than 100 degrees Fahrenheit, shortness of breath or chest pain, excessive nausea or vomiting with inability to tolerate oral intake, pain that is worsening despite current pain medications, heavy vaginal bleeding, or other concerns.          Zay Messer MD     Date: 3/14/2024  Time: 12:39 EDT

## 2024-03-14 NOTE — DISCHARGE INSTRUCTIONS
DISCHARGE INSTRUCTIONS  GYNECOLOGICAL  PROCEDURES      For your surgery you had:  General anesthesia (you may have a sore throat for the first 24 hours)  IV sedation  Local anesthesia  Monitored anesthesia care  You received a medicated patch for nausea prevention today (behind your ear). It is recommended that you remove it 24-48 hours post-operatively. It must be removed within 72 hours.  You received an anesthesia medication today that can cause hormonal forms of birth control to be ineffective. You should use a different form of birth control (to prevent pregnancy) for 7 days.   You may experience dizziness, drowsiness, or lightheadedness for several hours following surgery.  Do not stay alone today or tonight.  Limit your activity for 24 hours.  Resume your diet slowly.  Follow any special dietary instructions you may have been given by your doctor.  You should not drive or operate machinery, drink alcohol, or sign legally binding documents for 24 hours or while you are taking pain medication.    NOTIFY YOUR DOCTOR IF YOU EXPERIENCE ANY OF THE FOLLOWING:  Temperature greater than 101 degrees Fahrenheit  Shaking Chills  Redness or excessive drainage from incision  Nausea, vomiting and/or pain that is not controlled by prescribed medications  Increase in bleeding or bleeding that is excessive  Unable to urinate in 6 hours after surgery  If unable to reach your doctor, please go to the closest Emergency Room [] Remove dressing:      [] Skin adhesive will flake off in 10-14 days.    [] Nothing in the vagina for 2 weeks to include intercourse, douches, or tampons.  [] You may resume intercourse and the use of tampons as your physician has instructed you.      Vaginal bleeding may be expected for several days with flow decreasing with time and never any heavier than a normal  period.  If you have an ablation, vaginal discharge is expected after bleeding stops.   If you have foul smelling discharge, notify your  physician.  Medications per physician instructions as indicated on After Visit Summary.    Last dose of pain medication was given at:    .    SPECIAL INSTRUCTIONS:   No driving while taking narcotic pain medications  No intercourse, tampons, douching, nothing in the vagina until follow-up  No tub baths for 2 weeks  Okay to shower    Call for temperature greater than 100 °F, shortness of breath or chest pain, heavy vaginal bleeding soaking a pad in less than 1 hour, redness swelling or drainage from the incisions, excessive nausea or vomiting, or pain that is worsening despite current pain medications

## 2024-03-14 NOTE — ANESTHESIA PREPROCEDURE EVALUATION
Anesthesia Evaluation     Patient summary reviewed and Nursing notes reviewed   no history of anesthetic complications:   NPO Solid Status: > 8 hours  NPO Liquid Status: > 2 hours           Airway   Mallampati: II  TM distance: >3 FB  Neck ROM: full  No difficulty expected  Dental    (+) upper dentures and poor dentition        Pulmonary - normal exam    breath sounds clear to auscultation  (+) a smoker Current,  Cardiovascular - negative cardio ROS and normal exam  Exercise tolerance: good (4-7 METS)    Rhythm: regular  Rate: normal        Neuro/Psych- negative ROS  GI/Hepatic/Renal/Endo - negative ROS     Musculoskeletal     Abdominal    Substance History      OB/GYN          Other        ROS/Med Hx Other: PAT Nursing Notes unavailable.               Anesthesia Plan    ASA 2     general     (Patient understands anesthesia not responsible for dental damage.)  intravenous induction     Anesthetic plan, risks, benefits, and alternatives have been provided, discussed and informed consent has been obtained with: patient and spouse/significant other.    Plan discussed with CRNA.    CODE STATUS:

## 2024-03-14 NOTE — ANESTHESIA POSTPROCEDURE EVALUATION
Patient: Karey Hammond    Procedure Summary       Date: 03/14/24 Room / Location: [facility] OR 05 / [facility] MAIN OR    Anesthesia Start: 1150 Anesthesia Stop: 1224    Procedure: DILATATION AND CURETTAGE HYSTEROSCOPY ENDOMETRIAL ABLATION (Vagina) Diagnosis:       Abnormal uterine bleeding      (Abnormal uterine bleeding [N93.9])    Surgeons: Zay Messer MD Provider: Geo Keane MD    Anesthesia Type: general ASA Status: 2            Anesthesia Type: general    Vitals  Vitals Value Taken Time   /69 03/14/24 1314   Temp 36.2 °C (97.1 °F) 03/14/24 1313   Pulse 63 03/14/24 1316   Resp 16 03/14/24 1313   SpO2 98 % 03/14/24 1316   Vitals shown include unfiled device data.        Post Anesthesia Care and Evaluation    Patient location during evaluation: bedside  Patient participation: complete - patient participated  Level of consciousness: awake  Pain management: adequate    Airway patency: patent  PONV Status: none  Cardiovascular status: acceptable  Respiratory status: acceptable  Hydration status: acceptable    Comments: An Anesthesiologist personally participated in the most demanding procedures (including induction and emergence if applicable) in the anesthesia plan, monitored the course of anesthesia administration at frequent intervals and remained physically present and available for immediate diagnosis and treatment of emergencies.

## 2024-04-02 ENCOUNTER — OFFICE VISIT (OUTPATIENT)
Dept: OBSTETRICS AND GYNECOLOGY | Facility: CLINIC | Age: 39
End: 2024-04-02
Payer: MEDICAID

## 2024-04-02 VITALS
BODY MASS INDEX: 24.92 KG/M2 | WEIGHT: 132 LBS | SYSTOLIC BLOOD PRESSURE: 108 MMHG | DIASTOLIC BLOOD PRESSURE: 77 MMHG | HEART RATE: 120 BPM | HEIGHT: 61 IN

## 2024-04-02 DIAGNOSIS — Z48.89 POSTOPERATIVE VISIT: Primary | ICD-10-CM

## 2024-04-02 DIAGNOSIS — Z98.890 S/P ENDOMETRIAL ABLATION: ICD-10-CM

## 2024-04-02 PROBLEM — N93.9 ABNORMAL UTERINE BLEEDING: Status: RESOLVED | Noted: 2024-01-03 | Resolved: 2024-04-02

## 2024-04-02 PROCEDURE — 1159F MED LIST DOCD IN RCRD: CPT | Performed by: OBSTETRICS & GYNECOLOGY

## 2024-04-02 PROCEDURE — 99024 POSTOP FOLLOW-UP VISIT: CPT | Performed by: OBSTETRICS & GYNECOLOGY

## 2024-04-02 PROCEDURE — 1160F RVW MEDS BY RX/DR IN RCRD: CPT | Performed by: OBSTETRICS & GYNECOLOGY

## 2024-04-02 NOTE — PROGRESS NOTES
"Post Operative Visit      CC: Post operative follow up    Subjective:   Pain:  No  Vaginal bleeding:  No  Vaginal discharge: Yes still having the thin watery discharge intermittently  Fever/chills:  No  Good appetite:  No  Normal bladder function:  Yes  Normal bowel function:  Yes  Hot flashes: N/A  No other problems or concerns    /77   Pulse 120   Ht 154.9 cm (61\")   Wt 59.9 kg (132 lb)   Breastfeeding No   BMI 24.94 kg/m²     Physical Exam  Vitals and nursing note reviewed.   Constitutional:       General: She is not in acute distress.     Appearance: Normal appearance. She is not ill-appearing.   Neurological:      Mental Status: She is alert and oriented to person, place, and time.   Psychiatric:         Mood and Affect: Mood normal.         Behavior: Behavior normal.         Thought Content: Thought content normal.         Judgment: Judgment normal.       Operative report, surgical findings and any pathology reviewed.    Assessment and Plan:  Diagnoses and all orders for this visit:    1. Postoperative visit (Primary)  Assessment & Plan:  Stable postoperative course  May resume all normal activities  Recommend OTC NSAIDs for any further postoperative pain      2. S/P endometrial ablation         Any available photos and/or pathology were reviewed.  All questions answered.   Ok to resume normal activities  Ok to resume intercourse  Return to school/work without limitations    Follow Up:  Return for Annual physical.    Zay Messer MD  04/02/2024    "

## 2024-04-02 NOTE — ASSESSMENT & PLAN NOTE
Stable postoperative course  May resume all normal activities  Recommend OTC NSAIDs for any further postoperative pain

## 2024-04-05 ENCOUNTER — TELEPHONE (OUTPATIENT)
Dept: OBSTETRICS AND GYNECOLOGY | Facility: CLINIC | Age: 39
End: 2024-04-05
Payer: MEDICAID

## 2024-04-05 NOTE — TELEPHONE ENCOUNTER
[facility] received a fax that requires your attention. The document has been indexed to the patient’s chart for your review.      Reason for sending: RECORDS REQUEST    Documents Description: CONSULTS NOTES REQUESTED    Name of Sender: MARIAELENA LUCERO     Date Indexed: 03/25/202    Notes (if needed):

## 2025-08-01 ENCOUNTER — OFFICE VISIT (OUTPATIENT)
Dept: OBSTETRICS AND GYNECOLOGY | Age: 40
End: 2025-08-01
Payer: MEDICAID

## 2025-08-01 VITALS
BODY MASS INDEX: 25.52 KG/M2 | HEIGHT: 60 IN | WEIGHT: 130 LBS | HEART RATE: 80 BPM | SYSTOLIC BLOOD PRESSURE: 103 MMHG | DIASTOLIC BLOOD PRESSURE: 70 MMHG

## 2025-08-01 DIAGNOSIS — N93.9 ABNORMAL UTERINE BLEEDING (AUB): ICD-10-CM

## 2025-08-01 DIAGNOSIS — Z01.419 WOMEN'S ANNUAL ROUTINE GYNECOLOGICAL EXAMINATION: Primary | ICD-10-CM

## 2025-08-01 DIAGNOSIS — Z98.890 S/P ENDOMETRIAL ABLATION: ICD-10-CM

## 2025-08-01 PROCEDURE — G0123 SCREEN CERV/VAG THIN LAYER: HCPCS | Performed by: OBSTETRICS & GYNECOLOGY

## 2025-08-06 ENCOUNTER — RESULTS FOLLOW-UP (OUTPATIENT)
Dept: OBSTETRICS AND GYNECOLOGY | Age: 40
End: 2025-08-06
Payer: MEDICAID

## 2025-08-06 DIAGNOSIS — A59.9 TRICHOMONAS VAGINALIS INFECTION: Primary | ICD-10-CM

## 2025-08-06 LAB
CONV .: NORMAL
CYTOLOGIST CVX/VAG CYTO: NORMAL
CYTOLOGY CVX/VAG DOC CYTO: NORMAL
CYTOLOGY CVX/VAG DOC THIN PREP: NORMAL
DX ICD CODE: NORMAL
DX ICD CODE: NORMAL
OTHER STN SPEC: NORMAL
PATHOLOGIST CVX/VAG CYTO: NORMAL
SERVICE CMNT-IMP: NORMAL
STAT OF ADQ CVX/VAG CYTO-IMP: NORMAL

## 2025-08-06 RX ORDER — METRONIDAZOLE 500 MG/1
500 TABLET ORAL 2 TIMES DAILY
Qty: 14 TABLET | Refills: 0 | Status: SHIPPED | OUTPATIENT
Start: 2025-08-06 | End: 2025-08-13

## 2025-08-11 ENCOUNTER — HOSPITAL ENCOUNTER (OUTPATIENT)
Dept: ULTRASOUND IMAGING | Facility: HOSPITAL | Age: 40
Discharge: HOME | End: 2025-08-11
Admitting: OBSTETRICS & GYNECOLOGY
Payer: MEDICAID

## 2025-08-11 DIAGNOSIS — N93.9 ABNORMAL UTERINE BLEEDING (AUB): ICD-10-CM

## 2025-08-11 PROCEDURE — 76830 TRANSVAGINAL US NON-OB: CPT

## (undated) DEVICE — SHEET,DRAPE,70X85,STERILE: Brand: MEDLINE

## (undated) DEVICE — SLV SCD KN/LEN ADJ EXPRSS BLENDED MD 1P/U

## (undated) DEVICE — PROB ABL ENDOMTRL NOVASURE/G4 W/SURESND

## (undated) DEVICE — SOL NACL 0.9PCT 1000ML

## (undated) DEVICE — GOWN,NON-REINFORCED,SIRUS,SET IN SLV,XXL: Brand: MEDLINE

## (undated) DEVICE — 1000ML,PRESSURE INFUSER W/STOPCOCK: Brand: MEDLINE

## (undated) DEVICE — VAGINAL PREP TRAY: Brand: MEDLINE INDUSTRIES, INC.

## (undated) DEVICE — LITHOTOMY-YELLOW FINS: Brand: MEDLINE INDUSTRIES, INC.

## (undated) DEVICE — GLV SURG BIOGEL LTX PF 7

## (undated) DEVICE — CATH URETH INTRMIT ALLPURP LTX 16F RED